# Patient Record
Sex: MALE | Race: WHITE | NOT HISPANIC OR LATINO | URBAN - METROPOLITAN AREA
[De-identification: names, ages, dates, MRNs, and addresses within clinical notes are randomized per-mention and may not be internally consistent; named-entity substitution may affect disease eponyms.]

---

## 2017-08-23 ENCOUNTER — CHARTING TRANS (OUTPATIENT)
Dept: INTERNAL MEDICINE | Age: 22
End: 2017-08-23

## 2017-08-23 ENCOUNTER — CHARTING TRANS (OUTPATIENT)
Dept: OTHER | Age: 22
End: 2017-08-23

## 2017-08-23 ENCOUNTER — MYAURORA ACCOUNT LINK (OUTPATIENT)
Dept: OTHER | Age: 22
End: 2017-08-23

## 2017-08-24 ENCOUNTER — CHARTING TRANS (OUTPATIENT)
Dept: OTHER | Age: 22
End: 2017-08-24

## 2017-08-28 ENCOUNTER — CHARTING TRANS (OUTPATIENT)
Dept: SPORTS MEDICINE | Age: 22
End: 2017-08-28

## 2017-08-28 ENCOUNTER — IMAGING SERVICES (OUTPATIENT)
Dept: OTHER | Age: 22
End: 2017-08-28

## 2017-08-29 ENCOUNTER — CHARTING TRANS (OUTPATIENT)
Dept: OTHER | Age: 22
End: 2017-08-29

## 2017-08-30 ENCOUNTER — CHARTING TRANS (OUTPATIENT)
Dept: OTHER | Age: 22
End: 2017-08-30

## 2017-09-18 ENCOUNTER — CHARTING TRANS (OUTPATIENT)
Dept: OTHER | Age: 22
End: 2017-09-18

## 2017-09-20 ENCOUNTER — CHARTING TRANS (OUTPATIENT)
Dept: OTHER | Age: 22
End: 2017-09-20

## 2017-10-24 ENCOUNTER — CHARTING TRANS (OUTPATIENT)
Dept: OTHER | Age: 22
End: 2017-10-24

## 2017-10-26 ENCOUNTER — BH HISTORICAL (OUTPATIENT)
Dept: OTHER | Age: 22
End: 2017-10-26

## 2017-10-26 ENCOUNTER — CHARTING TRANS (OUTPATIENT)
Dept: SPORTS MEDICINE | Age: 22
End: 2017-10-26

## 2017-10-30 ENCOUNTER — BH HISTORICAL (OUTPATIENT)
Dept: OTHER | Age: 22
End: 2017-10-30

## 2017-11-09 ENCOUNTER — BH HISTORICAL (OUTPATIENT)
Dept: OTHER | Age: 22
End: 2017-11-09

## 2017-11-15 ENCOUNTER — BH HISTORICAL (OUTPATIENT)
Dept: OTHER | Age: 22
End: 2017-11-15

## 2017-11-27 ENCOUNTER — BH HISTORICAL (OUTPATIENT)
Dept: OTHER | Age: 22
End: 2017-11-27

## 2017-11-28 ENCOUNTER — MYAURORA ACCOUNT LINK (OUTPATIENT)
Dept: OTHER | Age: 22
End: 2017-11-28

## 2017-11-28 ENCOUNTER — CHARTING TRANS (OUTPATIENT)
Dept: OTHER | Age: 22
End: 2017-11-28

## 2017-12-07 ENCOUNTER — MYAURORA ACCOUNT LINK (OUTPATIENT)
Dept: OTHER | Age: 22
End: 2017-12-07

## 2017-12-12 ENCOUNTER — BH HISTORICAL (OUTPATIENT)
Dept: OTHER | Age: 22
End: 2017-12-12

## 2017-12-13 ENCOUNTER — BH HISTORICAL (OUTPATIENT)
Dept: OTHER | Age: 22
End: 2017-12-13

## 2017-12-27 ENCOUNTER — MYAURORA ACCOUNT LINK (OUTPATIENT)
Dept: OTHER | Age: 22
End: 2017-12-27

## 2017-12-27 ENCOUNTER — CHARTING TRANS (OUTPATIENT)
Dept: OTHER | Age: 22
End: 2017-12-27

## 2018-01-02 ENCOUNTER — CHARTING TRANS (OUTPATIENT)
Dept: OTHER | Age: 23
End: 2018-01-02

## 2018-06-22 ENCOUNTER — CHARTING TRANS (OUTPATIENT)
Dept: OTHER | Age: 23
End: 2018-06-22

## 2018-07-03 ENCOUNTER — IMAGING SERVICES (OUTPATIENT)
Dept: OTHER | Age: 23
End: 2018-07-03

## 2018-07-03 ENCOUNTER — CHARTING TRANS (OUTPATIENT)
Dept: OTHER | Age: 23
End: 2018-07-03

## 2018-07-05 ENCOUNTER — CHARTING TRANS (OUTPATIENT)
Dept: OTHER | Age: 23
End: 2018-07-05

## 2018-07-10 ENCOUNTER — CHARTING TRANS (OUTPATIENT)
Dept: OTHER | Age: 23
End: 2018-07-10

## 2018-07-24 ENCOUNTER — CHARTING TRANS (OUTPATIENT)
Dept: OTHER | Age: 23
End: 2018-07-24

## 2018-07-30 ENCOUNTER — CHARTING TRANS (OUTPATIENT)
Dept: OTHER | Age: 23
End: 2018-07-30

## 2018-08-01 ENCOUNTER — CHARTING TRANS (OUTPATIENT)
Dept: OTHER | Age: 23
End: 2018-08-01

## 2018-08-02 ENCOUNTER — MYAURORA ACCOUNT LINK (OUTPATIENT)
Dept: OTHER | Age: 23
End: 2018-08-02

## 2018-08-02 ENCOUNTER — CHARTING TRANS (OUTPATIENT)
Dept: OTHER | Age: 23
End: 2018-08-02

## 2018-08-06 ENCOUNTER — CHARTING TRANS (OUTPATIENT)
Dept: OTHER | Age: 23
End: 2018-08-06

## 2018-08-08 ENCOUNTER — CHARTING TRANS (OUTPATIENT)
Dept: OTHER | Age: 23
End: 2018-08-08

## 2018-08-09 ENCOUNTER — BH HISTORICAL (OUTPATIENT)
Dept: OTHER | Age: 23
End: 2018-08-09

## 2018-08-10 ENCOUNTER — BH HISTORICAL (OUTPATIENT)
Dept: OTHER | Age: 23
End: 2018-08-10

## 2018-08-15 ENCOUNTER — CHARTING TRANS (OUTPATIENT)
Dept: OTHER | Age: 23
End: 2018-08-15

## 2018-08-15 ENCOUNTER — MYAURORA ACCOUNT LINK (OUTPATIENT)
Dept: OTHER | Age: 23
End: 2018-08-15

## 2018-11-27 VITALS — WEIGHT: 214 LBS | BODY MASS INDEX: 27.46 KG/M2 | HEART RATE: 78 BPM | HEIGHT: 74 IN

## 2018-11-28 VITALS
TEMPERATURE: 97 F | BODY MASS INDEX: 27.34 KG/M2 | SYSTOLIC BLOOD PRESSURE: 144 MMHG | OXYGEN SATURATION: 97 % | HEART RATE: 90 BPM | HEIGHT: 74 IN | DIASTOLIC BLOOD PRESSURE: 82 MMHG | WEIGHT: 213 LBS

## 2018-11-28 VITALS
HEART RATE: 84 BPM | DIASTOLIC BLOOD PRESSURE: 90 MMHG | HEIGHT: 74 IN | SYSTOLIC BLOOD PRESSURE: 144 MMHG | WEIGHT: 214 LBS | BODY MASS INDEX: 27.46 KG/M2

## 2019-01-09 ENCOUNTER — TELEPHONE (OUTPATIENT)
Dept: BEHAVIORAL HEALTH | Age: 24
End: 2019-01-09

## 2019-03-05 VITALS — WEIGHT: 198 LBS | HEIGHT: 75 IN | BODY MASS INDEX: 24.62 KG/M2 | HEART RATE: 74 BPM

## 2019-03-05 VITALS
DIASTOLIC BLOOD PRESSURE: 78 MMHG | WEIGHT: 198 LBS | SYSTOLIC BLOOD PRESSURE: 116 MMHG | HEIGHT: 74 IN | BODY MASS INDEX: 25.41 KG/M2

## 2019-03-20 ENCOUNTER — HOSPITAL (OUTPATIENT)
Dept: OTHER | Age: 24
End: 2019-03-20
Attending: OTOLARYNGOLOGY

## 2019-03-26 LAB — PATHOLOGIST NAME: NORMAL

## 2023-08-01 ENCOUNTER — TELEMEDICINE (OUTPATIENT)
Dept: GASTROENTEROLOGY | Facility: CLINIC | Age: 28
End: 2023-08-01

## 2023-08-01 ENCOUNTER — TELEPHONE (OUTPATIENT)
Facility: CLINIC | Age: 28
End: 2023-08-01

## 2023-08-01 DIAGNOSIS — K58.0 IRRITABLE BOWEL SYNDROME WITH DIARRHEA: Primary | ICD-10-CM

## 2023-08-01 DIAGNOSIS — K21.9 GASTROESOPHAGEAL REFLUX DISEASE, UNSPECIFIED WHETHER ESOPHAGITIS PRESENT: ICD-10-CM

## 2023-08-01 PROCEDURE — 99203 OFFICE O/P NEW LOW 30 MIN: CPT | Performed by: INTERNAL MEDICINE

## 2023-08-01 RX ORDER — OMEPRAZOLE 40 MG/1
40 CAPSULE, DELAYED RELEASE ORAL DAILY
Qty: 90 CAPSULE | Refills: 3 | Status: SHIPPED | OUTPATIENT
Start: 2023-08-01

## 2023-08-01 NOTE — PATIENT INSTRUCTIONS
PLAN    - Take higher dose of omeprazole, 40 mg daily    - Take rifaximin 550 mg every 8 hrs for 14 days    - Start taking dicyclomine as needed    - Follow up in 6 weeks

## 2023-08-01 NOTE — TELEPHONE ENCOUNTER
I called and spoke to patient. He was able to find name of GI doctor. He called and requested records to be faxed over to us.   Follow-up appt 9/19/2023, per Dr Radha Viera    EGD/Colon records 2019, EGD 2021 at [address] [place] [address]  Dr Deo Muse   [phone]

## 2023-08-01 NOTE — H&P
[facility] - Gastroenterology                                                                                                               Reason for consult: IBS and GERD    Requesting physician or provider: Juluis Sacks, MD    Chief complaint - reflux, abdominal pain    VIDEO VISIT - the [employer] christos was used since there was a tech error through [address] HPI:   Glynda Shone is a 24year old year-old male here for the following:    Pt has been going to various GI physicians for 10 years and was given diagnoses of IBS and GERD. Pt is transitioning care since his previous physician didn't have much availability. He was taking omeprazole daily and dicyclomine TID for symptoms. He was also tried on elimination diets that didn't offer much benefit. The dicyclomine has significantly reduced his IBS related pain and diarrhea. He has abdominal discomfort in the upper and lower quadrants that can wax and wane. His last flare of diarrhea and abd pain was a month ago and lasted for a week - he had loose stools averaging 4-5x/day, sharp abdominal pain. He usually has 1-2 formed BMs per day. Denies any blood. Pt gets reflux every day. He takes OTC Prilosec, which has been helping. When he was taking omeprazole 20 mg daily, his symptoms were better but he was still having breakthrough symptoms. Denies f/c, dysphagia, n/v, blood in the stool, unintentional weight loss, or any other symptoms. Prior endoscopies:  EGD and CLN in 2019  EGD in 2021  Records not available in EMR.      Soc:  -denies smoking  -denies heavy Etoh  -no illicit drug use    Wt Readings from Last 6 Encounters:  02/28/22 : 234 lb (106.1 kg)  07/07/20 : 210 lb (95.3 kg)  04/03/17 : 210 lb (95.3 kg) (95 %, Z= 1.66)*  05/22/14 : 148 lb (67.1 kg) (62 %, Z= 0.31)*  05/15/14 : 146 lb (66.2 kg) (59 %, Z= 0.24)*    * Growth percentiles are based on CDC (Boys, 2-20 Years) data.     History, Medications, Allergies, ROS:      Past Medical History:   Diagnosis Date    ADHD     Allergic rhinitis, cause unspecified     Anxiety     Asthma     Bronchitis, not specified as acute or chronic     Depression     IBS (irritable bowel syndrome)     Pelvic kidney     right side    TBI (traumatic brain injury) 08/2017      Past Surgical History:   Procedure Laterality Date    COLONOSCOPY  2019    polyps    EGD  08/2021    with dilation, ?esophageal narrowing    EGD  2019      Family Hx: No family history on file. Social History:   Social History     Socioeconomic History    Marital status: Single   Tobacco Use    Smoking status: Never    Smokeless tobacco: Never   Substance and Sexual Activity    Alcohol use: No    Drug use: No   Other Topics Concern    Caffeine Concern Yes     Comment: coffee q day    Exercise Yes     Comment: daily        Medications (Active prior to today's visit):  Current Outpatient Medications   Medication Sig Dispense Refill    dicyclomine (BENTYL) 10 MG Oral Cap 10mg orally 1/2 before meals three times daily as needed for abdominal symtpoms. 90 capsule prn    cetirizine 10 MG Oral Tab Take 10 mg by mouth daily. omeprazole 20 MG Oral Capsule Delayed Release Take 20 mg by mouth daily.       clonazepam (KLONOPIN) 0.5 MG Oral Tab 2 tabs bid         Allergies:  No Known Allergies    ROS:   CONSTITUTIONAL:  negative for fevers, rigors  EYES:  negative for diplopia   RESPIRATORY:  negative for severe shortness of breath  CARDIOVASCULAR:  negative for crushing sub-sternal chest pain  GASTROINTESTINAL:  see HPI  GENITOURINARY:  negative for dysuria or gross hematuria  INTEGUMENT/BREAST:  SKIN:  negative for jaundice   ALLERGIC/IMMUNOLOGIC:  negative for hay fever  ENDOCRINE:  negative for cold intolerance and heat intolerance  MUSCULOSKELETAL:  negative for joint effusion/severe erythema  BEHAVIOR/PSYCH:  negative for psychotic behavior      PHYSICAL EXAM:   There were no vitals taken for this visit. GEN - Patient appears comfortable and in no acute discomfort  ENT - MMM  EYES - the sclera appears anicteric  CV - unable to examine  RESP -  No increased work of breathing  ABDOMEN - unable to examine  SKIN - No jaundice  NEURO - Alert and appropriate, and gross movements of extremities normal  PSYCH - normal affect, non-agitated    Labs/Imaging:     Patient's pertinent labs and imaging were reviewed and discussed with patient today. 3/2022 UGI with SBFT IMPRESSION:  THE ESOPHAGUS:  1. No esophageal constrictive lesions or segmental strictures. 2.  No spontaneous gastroesophageal reflux. 3.  Normal esophageal mucosal radiographic pattern, without specific radiographic signs of active  reflux esophagitis. THE STOMACH-DUODENUM:  Normal gastroduodenal distensibility, without mucosal fold thickening or discrete ulcer craters. THE SMALL BOWEL:  1.  No discrete jejunal-ileal intraluminal filling defects. 2.  Normal jejunoileal mucosal radiographic pattern, without specific signs of celiac disease or  inflammatory bowel disease. 3.  Normal small bowel transit time, without discrete enteric segmental structures    09.15.2020 CT ABDOMEN+PELVIS(ALL W+WO)(CPT=74178)  IMPRESSION:  Right-sided pelvic kidney. Otherwise, essentially normal urogram protocol CT of the abdomen and pelvis. 2017 CBC, CMP, TTG Negative      . ASSESSMENT/PLAN:     Ben Luo is a 24year old year-old male here for the following:    GERD - uncontrolled on low dose PPI. Chronic. Has had 2 EGDs - one in 2019 and another in 2021. Need records, will request today. IBS-D - pt meets criteria for IBS-D and has had improvement with bentyl. Need records from his previous EGDs/CLN, will request today. Labs from 2022 unremarkable. He would like to avoid needing to take this medication so frequently and wondering about alteratives.  Recommend empiric treatment for SIBO since this has never been done in the past and monitoring for improvement. Recommend    - omeprazole 40 mg daily    - rifaximin 550 mg TID x 14 days to treat SIBO    - wean bentyl as tolerated    RTC in 6 weeks. > 30 minute consultation/follow up today with >50% spent in face-to-face discussion with the patient/family as well as additional time spent in coordination of care/discussion with care team.         Orders This Visit:  No orders of the defined types were placed in this encounter. Meds This Visit:  Requested Prescriptions      No prescriptions requested or ordered in this encounter       Imaging & Referrals:  None         Krishna Ojeda MD          This note was partially prepared using [id number] [facility] [employer] voice recognition dictation software. As a result, errors may occur. When identified, these errors have been corrected.  While every attempt is made to correct errors during dictation, discrepancies may still exist.

## 2023-08-02 NOTE — TELEPHONE ENCOUNTER
Received EGD medical records done 8/12/2021 and  Colonoscopy/EGD done 10/15/2019  [facility]   [phone] Fax [phone]  [address] [facility], [address] [place] [address] Jef Montalvo      I placed them on Dr Coleta Lombard desk for review

## 2023-08-31 ENCOUNTER — TELEPHONE (OUTPATIENT)
Dept: GASTROENTEROLOGY | Facility: CLINIC | Age: 28
End: 2023-08-31

## 2023-08-31 ENCOUNTER — DOCUMENTATION ONLY (OUTPATIENT)
Dept: GASTROENTEROLOGY | Facility: CLINIC | Age: 28
End: 2023-08-31

## 2023-09-05 ENCOUNTER — TELEPHONE (OUTPATIENT)
Facility: CLINIC | Age: 28
End: 2023-09-05

## 2023-09-05 NOTE — TELEPHONE ENCOUNTER
Also see 8/31/2023 TE - Patient returning Dr Rivera Heading call. Please call [phone]. Thank you.